# Patient Record
Sex: FEMALE | Race: WHITE | Employment: FULL TIME | URBAN - METROPOLITAN AREA
[De-identification: names, ages, dates, MRNs, and addresses within clinical notes are randomized per-mention and may not be internally consistent; named-entity substitution may affect disease eponyms.]

---

## 2018-06-12 ENCOUNTER — OFFICE VISIT (OUTPATIENT)
Dept: CARDIOLOGY CLINIC | Age: 38
End: 2018-06-12

## 2018-06-12 ENCOUNTER — HOSPITAL ENCOUNTER (OUTPATIENT)
Dept: LAB | Age: 38
Discharge: HOME | End: 2018-06-12
Payer: COMMERCIAL

## 2018-06-12 VITALS
BODY MASS INDEX: 41.11 KG/M2 | DIASTOLIC BLOOD PRESSURE: 94 MMHG | OXYGEN SATURATION: 99 % | HEIGHT: 63 IN | WEIGHT: 232 LBS | SYSTOLIC BLOOD PRESSURE: 146 MMHG | HEART RATE: 93 BPM

## 2018-06-12 DIAGNOSIS — R55 VASOVAGAL SYNCOPE: ICD-10-CM

## 2018-06-12 DIAGNOSIS — I47.1 SVT (SUPRAVENTRICULAR TACHYCARDIA) (HCC): ICD-10-CM

## 2018-06-12 DIAGNOSIS — R00.2 PALPITATIONS: ICD-10-CM

## 2018-06-12 DIAGNOSIS — I47.1 SVT (SUPRAVENTRICULAR TACHYCARDIA) (HCC): Primary | ICD-10-CM

## 2018-06-12 DIAGNOSIS — Z98.890 S/P AV NODAL ABLATION: ICD-10-CM

## 2018-06-12 LAB
T4 FREE SERPL-MCNC: 1.1 NG/DL (ref 0.7–1.5)
TSH SERPL DL<=0.05 MIU/L-ACNC: 1.51 UIU/ML (ref 0.36–3.74)

## 2018-06-12 PROCEDURE — 84439 ASSAY OF FREE THYROXINE: CPT | Performed by: INTERNAL MEDICINE

## 2018-06-12 PROCEDURE — 36415 COLL VENOUS BLD VENIPUNCTURE: CPT | Performed by: INTERNAL MEDICINE

## 2018-06-12 RX ORDER — NORETHINDRONE ACETATE AND ETHINYL ESTRADIOL 1.5; 3 MG/1; UG/1
TABLET ORAL
COMMUNITY
Start: 2018-06-11

## 2018-06-12 NOTE — MR AVS SNAPSHOT
Anise Breeding 
 
 
 [address] [facility] [address] [address] 
[phone] Patient: Piper Humphreys MRN: [id number] BRP:4/5/[birth year]176 Visit Information Date & Time Provider Department Dept. Phone Encounter #  
 6/12/2018  1:20 PM Patrice Giraldo MD [address] [place] [address] Specialist at [facility]/[id number] Follow-up Instructions Return in about 4 weeks (around 7/10/2018). Your Appointments 7/11/2018  1:30 PM  
Follow Up with Patrice Giraldo MD  
Cardio Specialist at [facility]/[address]) Appt Note: Follow Up after echo  
 [address] [facility] [address]  
  
   
 [id number] [place] [address] [address] Upcoming Health Maintenance Date Due DTaP/Tdap/Td series (1 - Tdap) 7/5/2001 PAP AKA CERVICAL CYTOLOGY 7/5/2001 Influenza Age 5 to Adult 8/1/2018 Allergies as of 6/12/2018  Review Complete On: 6/12/2018 By: Luca Young LPN No Known Allergies Current Immunizations  Never Reviewed No immunizations on file. Not reviewed this visit You Were Diagnosed With   
  
 Codes Comments [facility]) [facility]    -  Primary ICD-10-CM: I47.1 ICD-9-CM: 427.89 Vitals BP Pulse Height(growth percentile) Weight(growth percentile) SpO2 BMI  
 (!) 146/94 93 5' 3\" (1.6 m) 232 lb (105.2 kg) 99% 41.1 kg/m2 OB Status Smoking Status Having regular periods Never Smoker BMI and BSA Data Body Mass Index Body Surface Area  
 41.1 kg/m 2 2.16 m 2 Your Updated Medication List  
  
   
This list is accurate as of 6/12/18  1:55 PM.  Always use your most recent med list.  
  
  
  
  
 Eugune Rudy 1.5/30 (21) 1.5-30 mg-mcg Tab Generic drug:  norethindrone ac-eth estradiol We Performed the Following AMB POC EKG ROUTINE W/ 12 LEADS, INTER & REP [71928 CPT(R)] Follow-up Instructions Return in about 4 weeks (around 7/10/2018). Patient Instructions Tami Macario will call patient regarding stress echo Labs ordered T4 and TSH-No appt needed done in building [address] Introducing [facility] SERVICES! Leeroy Porter introduces [employer] patient portal. Now you can access parts of your medical record, email your doctor's office, and request medication refills online. 1. In your internet browser, go to [email or web address] 2. Click on the First Time User? Click Here link in the Sign In box. You will see the New Member Sign Up page. 3. Enter your [employer] Access Code exactly as it appears below. You will not need to use this code after youve completed the sign-up process. If you do not sign up before the expiration date, you must request a new code. · [employer] Access Code: [email or web address] Expires: 9/10/2018  1:55 PM 
 
4. Enter the last four digits of your Social Security Number (xxxx) and Date of Birth (mm/dd/yyyy) as indicated and click Submit. You will be taken to the next sign-up page. 5. Create a [employer] ID. This will be your [employer] login ID and cannot be changed, so think of one that is secure and easy to remember. 6. Create a [employer] password. You can change your password at any time. 7. Enter your Password Reset Question and Answer. This can be used at a later time if you forget your password. 8. Enter your e-mail address. You will receive e-mail notification when new information is available in [address] 9. Click Sign Up. You can now view and download portions of your medical record. 10. Click the Download Summary menu link to download a portable copy of your medical information. If you have questions, please visit the Frequently Asked Questions section of the [employer] website. Remember, [employer] is NOT to be used for urgent needs. For medical emergencies, dial 911. Now available from your iPhone and Android! Please provide this summary of care documentation to your next provider. If you have any questions after today's visit, please call [phone].

## 2018-06-12 NOTE — PROGRESS NOTES
1. Have you been to the ER, urgent care clinic since your last visit? Hospitalized since your last visit? NP_No    2. Have you seen or consulted any other health care providers outside of the [facility] since your last visit? Include any pap smears or colon screening.  NP_ No

## 2018-06-12 NOTE — PATIENT INSTRUCTIONS
Tez Frausto will call patient regarding stress echo     Labs ordered T4 and TSH-No appt needed done in building [address]

## 2018-06-14 RX ORDER — ACETAMINOPHEN 500 MG
1 TABLET ORAL 3 TIMES WEEKLY
Qty: 1 KIT | Refills: 0 | Status: SHIPPED | OUTPATIENT
Start: 2018-06-15

## 2018-06-27 PROBLEM — E66.01 OBESITY, MORBID (HCC): Status: ACTIVE | Noted: 2018-06-27

## 2018-06-28 PROBLEM — Z86.79 S/P CATHETER ABLATION OF SLOW PATHWAY: Status: ACTIVE | Noted: 2018-06-28

## 2018-06-28 PROBLEM — I47.1 SVT (SUPRAVENTRICULAR TACHYCARDIA) (HCC): Status: ACTIVE | Noted: 2018-06-28

## 2018-06-28 PROBLEM — R00.2 PALPITATIONS: Status: ACTIVE | Noted: 2018-06-28

## 2018-06-28 PROBLEM — R55 VASOVAGAL SYNCOPE: Status: ACTIVE | Noted: 2018-06-28

## 2018-06-28 PROBLEM — Z98.890 S/P AV NODAL ABLATION: Status: ACTIVE | Noted: 2018-06-28

## 2018-06-28 NOTE — PROGRESS NOTES
Pankaj Carias is in the office today for cardiac evaluation. She is a 40 y.o. woman that has a history of SVT. She started having palpitations in 2004. She was initially treated with beta-blocker in 2007 to about 2010 when she had an ablation. She had a electrophysiological study and ablation in March 2010. At that time, she had a successful ablation of the slow pathway conduction. There was no evidence of an accessory pathway. Since that time, she still has occasional palpitations. They generally last for 5-10 seconds at a time. They have been aggravated by caffeine in the past.     The patient reports that just prior to this evaluation she developed some severe chest discomfort. She described it as crushing in quality. She felt her heart stopped for a second and then had this crushing sensation. She had some residual soreness. She had accompanying shortness of breath. The patient has no history of hypertension but has had elevated blood pressure in the office today. Patient's cardiac risk factors are none. Patient Active Problem List    Diagnosis Date Noted    SVT [facility]supraventricular tachycardia) ([facility] 06/28/2018    S/P catheter ablation of slow pathway 06/28/2018    Vasovagal syncope 06/28/2018    Palpitations 06/28/2018     Current Outpatient Prescriptions   Medication Sig Dispense Refill    Blood Pressure Test Kit-[facility] BLOOD PRESSURE) kit 1 Kit by Does Not Apply route Three (3) times a week. 1 Kit 0    JUNEL 1.5/30, 21, 1.5-30 mg-mcg tab        No Known Allergies  No past medical history on file. No past surgical history on file. No family history on file.   History   Smoking Status    Never Smoker   Smokeless Tobacco    Never Used          Review of Systems, additional:  Constitutional: negative  Eyes: negative  Respiratory: negative  Cardiovascular: positive for chest pain, palpitations  Gastrointestinal: negative  Musculoskeletal:negative  Neurological: negative  Behvioral/Psych: negative  Endocrine: negative  ENT: negative    Objective:     Visit Vitals    BP (!) 146/94    Pulse 93    Ht 5' 3\" (1.6 m)    Wt 232 lb (105.2 kg)    SpO2 99%    BMI 41.1 kg/m2     General:  alert, cooperative, no distress   Chest Wall: inspection normal - no chest wall deformities or tenderness, respiratory effort normal   Lung: clear to auscultation bilaterally   Heart:  normal rate and regular rhythm, S1 and S2 normal, no murmurs noted   Abdomen: soft, non-tender. Bowel sounds normal. No masses,  no organomegaly   Extremities: extremities normal, atraumatic, no cyanosis or edema Skin: no rashes   Neuro: alert, oriented, normal speech, no focal findings or movement disorder noted     EK2018; Sinus rhythm. Normal.    Assessment/Plan:         ICD-10-CM ICD-9-CM    1. SVT (supraventricular tachycardia) ([facility]) I47.1 427.89 JUNEL 1.5/30, 21, 1.5-30 mg-mcg tab      AMB POC EKG ROUTINE W/ 12 LEADS, INTER & REP      TSH AND FREE T4      Blood Pressure Test Kit-[facility] BLOOD PRESSURE) kit   2. S/P AV ja ablation, , Cindy [place], [place] W81.098 V45.89    3. Vasovagal syncope, no recent recurrence R55 780.2    4. Palpitations and chest pain, will order stress echocardiogram. Obtain T4 and TSH. Monitor BP with home unit. RT 3 to 4 weeks.  R00.2 785.1

## 2018-07-11 ENCOUNTER — OFFICE VISIT (OUTPATIENT)
Dept: CARDIOLOGY CLINIC | Age: 38
End: 2018-07-11

## 2018-07-11 VITALS
DIASTOLIC BLOOD PRESSURE: 96 MMHG | HEART RATE: 82 BPM | BODY MASS INDEX: 40.4 KG/M2 | WEIGHT: 228 LBS | HEIGHT: 63 IN | SYSTOLIC BLOOD PRESSURE: 141 MMHG | OXYGEN SATURATION: 97 %

## 2018-07-11 DIAGNOSIS — R00.2 PALPITATIONS: ICD-10-CM

## 2018-07-11 DIAGNOSIS — Z98.890 S/P CATHETER ABLATION OF SLOW PATHWAY: ICD-10-CM

## 2018-07-11 DIAGNOSIS — I47.1 SVT (SUPRAVENTRICULAR TACHYCARDIA) (HCC): Primary | ICD-10-CM

## 2018-07-11 DIAGNOSIS — Z86.79 S/P CATHETER ABLATION OF SLOW PATHWAY: ICD-10-CM

## 2018-07-11 RX ORDER — LABETALOL 100 MG/1
TABLET, FILM COATED ORAL 2 TIMES DAILY
COMMUNITY
End: 2018-07-11 | Stop reason: SDUPTHER

## 2018-07-11 NOTE — PROGRESS NOTES
Araceli Barlow is in the office today for cardiac evaluation.     She is a 40 y.o. woman that has a history of SVT. She started having palpitations in 2004. She was initially treated with beta-blocker in 2007 to about 2010 when she had an ablation. She had a electrophysiological study and ablation in March 2010. At that time, she had a successful ablation of the slow pathway conduction. There was no evidence of an accessory pathway.      She is here today in follow up after her last visit in June. She brings a log of her blood pressures, systolic readings range between 205-903, diastolic readings are in the 90's. At her last visit, she complained of a crushing type chest pain in the center of her chest, and she had a soreness in her chest throughout the rest of the day. She has not had any episodes of chest pain since then. No shortness of breath, palpitations or dizziness. She does report that her menstrual period is late. States she took two home pregnancy tests, and they both were negative. She last tested on Saturday July 7th. Patient's cardiac risk factors are none. Patient Active Problem List    Diagnosis Date Noted    SVT (supraventricular tachycardia) ([facility] [address] 06/28/2018    S/P catheter ablation of slow pathway 06/28/2018    Vasovagal syncope 06/28/2018    Palpitations 06/28/2018     Current Outpatient Prescriptions   Medication Sig Dispense Refill    labetalol (NORMODYNE) 100 mg tablet Take  by mouth two (2) times a day.  Blood Pressure Test Kit-Large (SELF-TAKING BLOOD PRESSURE) kit 1 Kit by Does Not Apply route Three (3) times a week. 1 Kit 0    JUNEL 1.5/30, 21, 1.5-30 mg-mcg tab        No Known Allergies  No past medical history on file. No past surgical history on file. No family history on file.   History   Smoking Status    Never Smoker   Smokeless Tobacco    Never Used          Review of Systems, additional:  Constitutional: negative  Eyes: negative  Respiratory: negative  Cardiovascular: negative for chest pain, palpitations  Gastrointestinal: negative  Musculoskeletal:negative  Neurological: negative  Behvioral/Psych: negative  Endocrine: negative  ENT: negative    Objective:     Visit Vitals    BP (!) 141/96    Pulse 82    Ht 5' 3\" (1.6 m)    Wt 228 lb (103.4 kg)    SpO2 97%    BMI 40.39 kg/m2     General:  alert, cooperative, no distress   Chest Wall: inspection normal - no chest wall deformities or tenderness, respiratory effort normal   Lung: clear to auscultation bilaterally   Heart:  normal rate and regular rhythm, S1 and S2 normal, no murmurs noted   Abdomen: soft, non-tender. Bowel sounds normal. No masses,  no organomegaly   Extremities: extremities normal, atraumatic, no cyanosis or edema Skin: no rashes   Neuro: alert, oriented, normal speech, no focal findings or movement disorder noted     EK2018; Sinus rhythm. Normal.    Assessment/Plan:         ICD-10-CM ICD-9-CM    1. SVT (supraventricular tachycardia) ([address]-- s/p EP study and ablation. Stable. I47.1 427.89 JUNEL 1., 21, 1.5-30 mg-mcg tab      AMB POC EKG ROUTINE W/ 12 LEADS, INTER & REP      TSH AND FREE T4      Blood Pressure Test Kit-Large (SELF-TAKING BLOOD PRESSURE) kit   2. S/P AV ja ablation, , Cindy [place], [place] U19.714 V45.89    3. Vasovagal syncope, no recent recurrence R55 780.2    4. Palpitations and chest pain, will order stress echocardiogram. T4 and TSH are within normal limits. R00.2 785.1    5. Hypertension: Start Labetalol 100 mg BID as she may have had missed period and pregnancy status is unknown. Follow up after stress echo, advised to take home pregnancy test again. Patient seen and examined independently. Corroborated on assessment and plan as noted above.  Tete Lipscomb MD

## 2018-07-11 NOTE — MR AVS SNAPSHOT
Nichol Arreguin 
 
 
 [facility] [address] [address] 
[phone] Patient: Donnette Gitelman MRN: [id number] SVQ:4/2/[birth year]342 Visit Information Date & Time Provider Department Dept. Phone Encounter #  
 7/11/2018  1:30 PM MD Shahbaz Yousif [facility]es [facility] Specialist at [facility] [phone] [id number] Follow-up Instructions Return in about 4 weeks (around 8/8/2018). Your Appointments 8/8/2018  2:40 PM  
Follow Up with Ivan Martínez PA-C Cardio Specialist at [facility] Mechelle Pool) Appt Note: f/u after echo with PA  
 [facility] [address] [address] [address] Upcoming Health Maintenance Date Due DTaP/Tdap/Td series (1 - Tdap) 7/5/2001 PAP AKA CERVICAL CYTOLOGY 7/5/2001 Influenza Age 5 to Adult 8/1/2018 Allergies as of 7/11/2018  Review Complete On: 7/11/2018 By: Sussy Lovell LPN No Known Allergies Current Immunizations  Never Reviewed No immunizations on file. Not reviewed this visit You Were Diagnosed With   
  
 Codes Comments [facility]    -  Primary ICD-10-CM: I47.1 ICD-9-CM: 427.89 S/P catheter ablation of slow pathway     ICD-10-CM: Z98.890, Z86.79 
ICD-9-CM: V45.89 Palpitations     ICD-10-CM: R00.2 ICD-9-CM: 785.1 Vitals BP Pulse Height(growth percentile) Weight(growth percentile) SpO2 BMI  
 (!) 141/96 82 5' 3\" (1.6 m) 228 lb (103.4 kg) 97% 40.39 kg/m2 OB Status Smoking Status Having regular periods Never Smoker BMI and BSA Data Body Mass Index Body Surface Area  
 40.39 kg/m 2 2.14 m 2 Preferred Pharmacy Pharmacy Name Phone [id number] [phone] Your Updated Medication List  
  
   
 This list is accurate as of 7/11/18  2:08 PM.  Always use your most recent med list.  
  
  
  
  
 Blood Pressure Test Kit-Large Kit Commonly known as:  SELF-TAKING BLOOD PRESSURE  
1 Kit by Does Not Apply route Three (3) times a week. Polo Diaz 1.5/30 (21) 1.5-30 mg-mcg Tab Generic drug:  norethindrone ac-eth estradiol  
  
 labetalol 100 mg tablet Commonly known as:  Arianna Gala Take  by mouth two (2) times a day. Follow-up Instructions Return in about 4 weeks (around 8/8/2018). To-Do List   
 07/11/2018 Echocardiography:  ECHO ADULT COMPLETE Patient Instructions Start Labetalol 100 mg BID  
 
F/U 4 weeks after Echo with PA Echo Ordered Paola Hunt will call to schedule your testing within 24 hours. If you do not hear from her, then please call her directly at [phone]. Introducing [facility] & HEALTH SERVICES! [employer] introduces [employer] patient portal. Now you can access parts of your medical record, email your doctor's office, and request medication refills online. 1. In your internet browser, go to [email or web address] 2. Click on the First Time User? Click Here link in the Sign In box. You will see the New Member Sign Up page. 3. Enter your [employer] Access Code exactly as it appears below. You will not need to use this code after youve completed the sign-up process. If you do not sign up before the expiration date, you must request a new code. · [employer] Access Code: [email or web address] Expires: 9/10/2018  1:55 PM 
 
4. Enter the last four digits of your Social Security Number (xxxx) and Date of Birth (mm/dd/yyyy) as indicated and click Submit. You will be taken to the next sign-up page. 5. Create a [employer] ID. This will be your [employer] login ID and cannot be changed, so think of one that is secure and easy to remember. 6. Create a [employer] password. You can change your password at any time. 7. Enter your Password Reset Question and Answer. This can be used at a later time if you forget your password. 8. Enter your e-mail address. You will receive e-mail notification when new information is available in [address] 9. Click Sign Up. You can now view and download portions of your medical record. 10. Click the Download Summary menu link to download a portable copy of your medical information. If you have questions, please visit the Frequently Asked Questions section of the [employer] website. Remember, [employer] is NOT to be used for urgent needs. For medical emergencies, dial 911. Now available from your iPhone and Android! Please provide this summary of care documentation to your next provider. Your primary care clinician is listed as Mignon Santo. If you have any questions after today's visit, please call [phone].

## 2018-07-11 NOTE — PATIENT INSTRUCTIONS
Start Labetalol 100 mg BID     F/U 4 weeks after Echo with PA    Echo Ordered _ Fitz Akhtar will call to schedule your testing within 24 hours. If you do not hear from her, then please call her directly at [phone].

## 2018-07-11 NOTE — PROGRESS NOTES
1. Have you been to the ER, urgent care clinic since your last visit? Hospitalized since your last visit? No     2. Have you seen or consulted any other health care providers outside of the [address] [place] [address] since your last visit? Include any pap smears or colon screening.   No

## 2018-07-12 PROBLEM — E66.01 OBESITY, MORBID (HCC): Status: ACTIVE | Noted: 2018-07-12

## 2018-07-12 RX ORDER — LABETALOL 100 MG/1
100 TABLET, FILM COATED ORAL 2 TIMES DAILY
Qty: 60 TAB | Refills: 3 | Status: SHIPPED | OUTPATIENT
Start: 2018-07-12

## 2018-07-16 DIAGNOSIS — I47.1 SVT (SUPRAVENTRICULAR TACHYCARDIA) (HCC): ICD-10-CM

## 2018-07-16 DIAGNOSIS — R00.2 PALPITATIONS: Primary | ICD-10-CM

## 2018-09-20 DIAGNOSIS — I47.1 SVT (SUPRAVENTRICULAR TACHYCARDIA) (HCC): Primary | ICD-10-CM

## 2018-09-20 DIAGNOSIS — R00.2 PALPITATIONS: ICD-10-CM

## 2018-09-20 DIAGNOSIS — R55 VASOVAGAL SYNCOPE: ICD-10-CM
